# Patient Record
Sex: MALE | Race: WHITE | NOT HISPANIC OR LATINO | Employment: OTHER | URBAN - NONMETROPOLITAN AREA
[De-identification: names, ages, dates, MRNs, and addresses within clinical notes are randomized per-mention and may not be internally consistent; named-entity substitution may affect disease eponyms.]

---

## 2019-08-02 ENCOUNTER — OFFICE VISIT (OUTPATIENT)
Dept: OTOLARYNGOLOGY | Facility: CLINIC | Age: 79
End: 2019-08-02

## 2019-08-02 VITALS — WEIGHT: 188 LBS | BODY MASS INDEX: 25.47 KG/M2 | HEIGHT: 72 IN | OXYGEN SATURATION: 98 %

## 2019-08-02 DIAGNOSIS — Z71.1 FEARED CONDITION NOT DEMONSTRATED: ICD-10-CM

## 2019-08-02 DIAGNOSIS — H93.293 ABNORMAL AUDITORY PERCEPTION OF BOTH EARS: Primary | ICD-10-CM

## 2019-08-02 PROCEDURE — 99202 OFFICE O/P NEW SF 15 MIN: CPT | Performed by: OTOLARYNGOLOGY

## 2019-08-02 RX ORDER — ALBUTEROL SULFATE 90 UG/1
AEROSOL, METERED RESPIRATORY (INHALATION) EVERY 4 HOURS
COMMUNITY

## 2019-08-02 RX ORDER — MONTELUKAST SODIUM 10 MG/1
TABLET ORAL
COMMUNITY

## 2019-08-02 RX ORDER — SIMVASTATIN 40 MG
TABLET ORAL
COMMUNITY

## 2019-08-02 RX ORDER — AMIODARONE HYDROCHLORIDE 200 MG/1
TABLET ORAL
COMMUNITY

## 2019-08-02 RX ORDER — TERAZOSIN 5 MG/1
CAPSULE ORAL
COMMUNITY

## 2019-08-02 RX ORDER — LISINOPRIL 40 MG/1
TABLET ORAL
COMMUNITY

## 2019-08-05 NOTE — PROGRESS NOTES
Subjective   Jose Carlos Brush is a 79 y.o. male.     History of Present Illness   Patient is here for evaluation of his ears.  Wears hearing aids.  Wife thinks his hearing is worse and wonders if he needs his ears cleaned.  Is not having any otorrhea.  No previous otologic surgery.  No otalgia.      The following portions of the patient's history were reviewed and updated as appropriate: allergies, current medications, past family history, past medical history, past social history, past surgical history and problem list.      Jose Carlos Brush reports that he has quit smoking. He has never used smokeless tobacco.  Patient is not a tobacco user and has not been counseled for use of tobacco products    Family History   Problem Relation Age of Onset   • Cancer Mother    • Heart disease Mother    • Heart disease Father        Allergies no known allergies      Current Outpatient Medications:   •  albuterol sulfate HFA (VENTOLIN HFA) 108 (90 Base) MCG/ACT inhaler, Every 4 (Four) Hours., Disp: , Rfl:   •  amiodarone (PACERONE) 200 MG tablet, amiodarone 200 mg tablet  Take 1 tablet every day by oral route., Disp: , Rfl:   •  lisinopril (PRINIVIL,ZESTRIL) 40 MG tablet, lisinopril 40 mg tablet  Take 1 tablet every day by oral route., Disp: , Rfl:   •  montelukast (SINGULAIR) 10 MG tablet, montelukast 10 mg tablet  Take 1 tablet every day by oral route., Disp: , Rfl:   •  rivaroxaban (XARELTO) 20 MG tablet, Xarelto 20 mg tablet  Take 1 tablet every day by oral route., Disp: , Rfl:   •  simvastatin (ZOCOR) 40 MG tablet, simvastatin 40 mg tablet  Take 1 tablet every day by oral route., Disp: , Rfl:   •  terazosin (HYTRIN) 5 MG capsule, terazosin 5 mg capsule  Take 1 capsule every day by oral route., Disp: , Rfl:     Past Medical History:   Diagnosis Date   • Arrhythmia    • COPD (chronic obstructive pulmonary disease) (CMS/HCC)    • High blood pressure        Past Surgical History:   Procedure Laterality Date   • PROSTATECTOMY            Review of Systems   Constitutional: Negative for fever.   Respiratory: Negative for cough.    All other systems reviewed and are negative.          Objective   Physical Exam  General: Well-developed well-nourished male in no acute distress.  Alert and oriented x3. Head: Normocephalic. Face: Symmetrical strength and appearance. PERRL. EOMI. Voice:Strong. Speech:Fluent  Ears: External ears no deformity, canals no discharge, tympanic membranes intact clear and mobile bilaterally.  Nose: Nares show no discharge mass polyp or purulence.  Boggy mucosa is present.  No gross external deformity.  Septum: Midline  Oral cavity: Lips and gums without lesions.  Tongue and floor of mouth without lesions.  Parotid and submandibular ducts unobstructed.  No mucosal lesions on the buccal mucosa or vestibule of the mouth.  Pharynx: No erythema exudate mass or ulcer  Neck: No lymphadenopathy.  No thyromegaly.  Trachea and larynx midline.  No masses in the parotid or submandibular glands.        Assessment/Plan   Jose Carlos was seen today for ear problem.    Diagnoses and all orders for this visit:    Abnormal auditory perception of both ears    Feared condition not demonstrated      Plan: Explained to the patient that he did not have any wax or other treatable cause of hearing loss.  I told him to check with his hearing aid provider first to make sure that hearing aid function was optimized.  If this continues to be a problem he could return for an audiogram.